# Patient Record
Sex: MALE | URBAN - METROPOLITAN AREA
[De-identification: names, ages, dates, MRNs, and addresses within clinical notes are randomized per-mention and may not be internally consistent; named-entity substitution may affect disease eponyms.]

---

## 2023-11-21 ENCOUNTER — APPOINTMENT (RX ONLY)
Dept: URBAN - METROPOLITAN AREA CLINIC 166 | Facility: CLINIC | Age: 48
Setting detail: DERMATOLOGY
End: 2023-11-21

## 2023-11-21 DIAGNOSIS — L08.9 LOCAL INFECTION OF THE SKIN AND SUBCUTANEOUS TISSUE, UNSPECIFIED: ICD-10-CM

## 2023-11-21 DIAGNOSIS — L259 CONTACT DERMATITIS AND OTHER ECZEMA, UNSPECIFIED CAUSE: ICD-10-CM

## 2023-11-21 PROBLEM — L30.8 OTHER SPECIFIED DERMATITIS: Status: ACTIVE | Noted: 2023-11-21

## 2023-11-21 PROCEDURE — ? COUNSELING

## 2023-11-21 PROCEDURE — ? ORDER TESTS

## 2023-11-21 PROCEDURE — ? PHOTO-DOCUMENTATION

## 2023-11-21 PROCEDURE — ? OTHER

## 2023-11-21 PROCEDURE — 99204 OFFICE O/P NEW MOD 45 MIN: CPT

## 2023-11-21 PROCEDURE — ? PRESCRIPTION

## 2023-11-21 PROCEDURE — ? TREATMENT REGIMEN

## 2023-11-21 RX ORDER — TACROLIMUS 1 MG/G
1 OINTMENT TOPICAL BID
Qty: 60 | Refills: 11 | Status: ERX | COMMUNITY
Start: 2023-11-21

## 2023-11-21 RX ADMIN — TACROLIMUS 1: 1 OINTMENT TOPICAL at 00:00

## 2023-11-21 ASSESSMENT — LOCATION SIMPLE DESCRIPTION DERM
LOCATION SIMPLE: LEFT CHEEK
LOCATION SIMPLE: RIGHT CHEEK

## 2023-11-21 ASSESSMENT — LOCATION DETAILED DESCRIPTION DERM
LOCATION DETAILED: LEFT CENTRAL MALAR CHEEK
LOCATION DETAILED: LEFT MEDIAL MALAR CHEEK
LOCATION DETAILED: RIGHT MEDIAL MALAR CHEEK

## 2023-11-21 ASSESSMENT — LOCATION ZONE DERM: LOCATION ZONE: FACE

## 2023-11-21 NOTE — PROCEDURE: TREATMENT REGIMEN
Continue Regimen: White masks\\n.
Detail Level: Zone
Initiate Treatment: tacrolimus 0.1 % topical ointment BID (steroid-sparing)\\n.
Otc Regimen: Polysporin and/or neosporin BID \\n.

## 2023-11-21 NOTE — PROCEDURE: ORDER TESTS
Performing Laboratory: [id number]
Bill For Surgical Tray: no
Billing Type: Third-Party Bill
Expected Date Of Service: 11/21/2023

## 2023-11-21 NOTE — PROCEDURE: PHOTO-DOCUMENTATION
Photo Preface (Leave Blank If You Do Not Want): Photographs were obtained today 11/21/23
Detail Level: Zone

## 2023-11-21 NOTE — PROCEDURE: OTHER
Render Risk Assessment In Note?: no
Note Text (......Xxx Chief Complaint.): This diagnosis correlates with the
Detail Level: Detailed
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\\nswitched to new masks 1-2 months ago - the green ones caused itching\\nchanged back to white surgical masks, and the itching subsided, but the rash still persisted\\nThen he thought it was the CPAP\\nhe has been using it for years, but no change in the type of mask\\n\\ndoesn't notice correlation with alcohol or getting hot\\ndrinks once a day sometimes\\nNo new pillow cases, no new sheets, no new towels\\nstarted Xyzal QD for seasonal allergies that started acting up 2-3 months ago\\n\\nAquaphor QD, helps it sometimes, but it still flares\\nEucerin eczema cream with oatmeal, used just once today, which helped\\nSwitched Neutrogena face lotion with HA to cetaphil face moisturizer daily oil free lotion \"fragrance free\" 3-4 months ago\\nstill used neutrogena face lotion occasionally\\nUses cetaphil lotion QD, still using\\nnever used cetaphil lotion before\\n\\nfor the eczematous dermatitis\\nStart tacrolimus BID\\nalso consider rosacea (less likely - is more scaly, no red papules), lupus (no photosensitivity, joint aches, PHx/FHx of autoimmune disease, etc. )\\n\\nI also think it is infected - it is weeping\\nStart poly/neosporin BID\\nstart Vinegar soaks\\nCx done

## 2023-11-26 ENCOUNTER — RX ONLY (OUTPATIENT)
Age: 48
Setting detail: RX ONLY
End: 2023-11-26

## 2023-11-26 RX ORDER — CEPHALEXIN 500 MG/1
1 CAPSULE ORAL
Qty: 28 | Refills: 0 | Status: ERX | COMMUNITY
Start: 2023-11-26

## 2023-12-14 ENCOUNTER — RX ONLY (OUTPATIENT)
Age: 48
Setting detail: RX ONLY
End: 2023-12-14

## 2023-12-14 RX ORDER — CEPHALEXIN 500 MG/1
1 CAPSULE ORAL
Qty: 28 | Refills: 0 | Status: ERX

## 2023-12-14 RX ORDER — MUPIROCIN 20 MG/G
1 OINTMENT TOPICAL BID
Qty: 22 | Refills: 6 | Status: ERX | COMMUNITY
Start: 2023-12-13

## 2023-12-22 ENCOUNTER — RX ONLY (OUTPATIENT)
Age: 48
Setting detail: RX ONLY
End: 2023-12-22

## 2023-12-22 RX ORDER — DOXYCYCLINE 100 MG/1
1 CAPSULE ORAL BID
Qty: 28 | Refills: 0 | Status: ERX | COMMUNITY
Start: 2023-12-21

## 2024-01-08 ENCOUNTER — RX ONLY (OUTPATIENT)
Age: 49
Setting detail: RX ONLY
End: 2024-01-08

## 2024-01-08 RX ORDER — DOXYCYCLINE 100 MG/1
1 CAPSULE ORAL BID
Qty: 28 | Refills: 0 | Status: ERX

## 2024-01-22 ENCOUNTER — APPOINTMENT (RX ONLY)
Dept: URBAN - METROPOLITAN AREA CLINIC 170 | Facility: CLINIC | Age: 49
Setting detail: DERMATOLOGY
End: 2024-01-22

## 2024-01-22 DIAGNOSIS — L82.1 OTHER SEBORRHEIC KERATOSIS: ICD-10-CM

## 2024-01-22 DIAGNOSIS — L08.9 LOCAL INFECTION OF THE SKIN AND SUBCUTANEOUS TISSUE, UNSPECIFIED: ICD-10-CM

## 2024-01-22 DIAGNOSIS — L259 CONTACT DERMATITIS AND OTHER ECZEMA, UNSPECIFIED CAUSE: ICD-10-CM

## 2024-01-22 PROBLEM — L30.8 OTHER SPECIFIED DERMATITIS: Status: ACTIVE | Noted: 2024-01-22

## 2024-01-22 PROBLEM — H01.136 ECZEMATOUS DERMATITIS OF LEFT EYE, UNSPECIFIED EYELID: Status: ACTIVE | Noted: 2024-01-22

## 2024-01-22 PROCEDURE — ? OTHER

## 2024-01-22 PROCEDURE — ? COUNSELING

## 2024-01-22 PROCEDURE — ? TREATMENT REGIMEN

## 2024-01-22 PROCEDURE — ? PHOTO-DOCUMENTATION

## 2024-01-22 ASSESSMENT — LOCATION SIMPLE DESCRIPTION DERM
LOCATION SIMPLE: RIGHT ZYGOMA
LOCATION SIMPLE: LEFT EYELID
LOCATION SIMPLE: RIGHT CHEEK
LOCATION SIMPLE: LEFT CHEEK

## 2024-01-22 ASSESSMENT — LOCATION DETAILED DESCRIPTION DERM
LOCATION DETAILED: RIGHT MEDIAL MALAR CHEEK
LOCATION DETAILED: LEFT CENTRAL MALAR CHEEK
LOCATION DETAILED: RIGHT CENTRAL ZYGOMA
LOCATION DETAILED: LEFT LATERAL CANTHUS
LOCATION DETAILED: LEFT MEDIAL MALAR CHEEK

## 2024-01-22 ASSESSMENT — LOCATION ZONE DERM
LOCATION ZONE: EYELID
LOCATION ZONE: FACE

## 2024-01-22 NOTE — PROCEDURE: PHOTO-DOCUMENTATION
Photo Preface (Leave Blank If You Do Not Want): Photographs were obtained today 1/22/24
Detail Level: Zone

## 2024-01-22 NOTE — PROCEDURE: OTHER
Render Risk Assessment In Note?: no
Note Text (......Xxx Chief Complaint.): This diagnosis correlates with the
Detail Level: Detailed
Other (Free Text): dry and flaky rash on face (cheeks) since 10/23\\nbecame weepy in 11/23\\nsee 11/21/23 visit note for more details \\n\\nconsidered an eczematous dermatitis/ACD\\npossibly worse with green surgical masks (caused itching), better with white surgical masks (less itching but rash persisted)\\nno obvious contact allergens\\n\\n11/21/23 Rx'd tacrolimus BID for presumed eczematous dermatitis\\nalso considered rosacea, lupus\\n\\nAlso looked infected - it was weeping\\nStarted poly/neosporin BID\\nstarted Vinegar soaks\\nCx was positive for MSSA - Dx'd with erysipelas\\nRx'd keflex\\n\\n12/13/23\\nPatient improved significantly with just neosporin and vinegar soaks for a couple of days. \\nAfter he started Keflex, he had significant improvement. \\nHis redness was almost completely gone after 2-3 days. \\nPatient states he has a small fissure in the skin crease where the nose meets the cheek since this all started\\n\\nThen it flared up again significantly a few days after finishing Keflex\\nwas still using vinegar soaks BID and Neosporin BID \\nRx'd another 14d course of Keflex and mupirocin\\n\\n12/21/23 \\nHe was getting better briefly, then got red again while on Keflex, mupirocin, neosporin, and vinegar soaks daily.  \\nRx'd doxy x 14d instead\\n\\n1/8/24\\nIt wasn't until near the end of the doxy course (while also using mupirocin) that his skin finally started to improve. \\nRx'd another 2 week course of doxy.  \\n\\n1/19/24\\nThe patient flared up again despite being on doxy, vinegar soaks QD and mupirocin TID\\nIt would get better and worse.\\nHe had new patches on his lateral cheek and lateral to the mouth.\\nHe also had mild eye symptoms, with difficulty with fine focus\\nI wanted to see him to determine if he had erysipelas, rosacea, or lupus...consider bx\\n\\n1/22/24\\nHe is almost done with the second course of doxy\\nstill using mupirocin (instead of neosporin)\\njust stopped vinegar soaks last Thursday\\nThe rash comes and goes and gets better and worse\\n\\nhas more eye sensitivity to light\\nA little itchy, watery, harder to read compared to before\\n\\nIt looks more eczematous today\\nI found out he never really used the tacrolimus\\nI was going to consider a bx, but I think he should try tacrolimus and hypoallergenic products first\\nrefer to [email or web address]\\n\\nusing Rodríguez shampoo and conditioner x years\\nCetaphil face wash x years\\nWas using neutrogena face lotion x 1 year\\nMore recently, started Eucerin with colloidal oatmeal prior to seeing me, but after the rash appeared\\nInitially though it was due to the masks at work - caused itching, but got better when he switched types\\nhas been using a CPAP x a couple years, but has not been using it since this started\\n- washes it with soap and water\\n - wipes it with antibacterial wipes\\nusing hair gel, not spray\\nNo change in detergent\\nNo other things that touch the face that he can think of

## 2024-01-22 NOTE — PROCEDURE: TREATMENT REGIMEN
Continue Regimen: White masks\\n.\\nDoxycycline 100mg capsules until done with course\\n.
Detail Level: Zone
Modify Regimen: Restart tacrolimus 0.1 % topical ointment BID (steroid-sparing)\\n.
Continue Regimen: Mupirocin 2% ointment until clear for open cuts and/or fissures\\n.